# Patient Record
Sex: FEMALE | Race: WHITE | Employment: OTHER | URBAN - METROPOLITAN AREA
[De-identification: names, ages, dates, MRNs, and addresses within clinical notes are randomized per-mention and may not be internally consistent; named-entity substitution may affect disease eponyms.]

---

## 2017-01-09 RX ORDER — ALPRAZOLAM 0.25 MG/1
TABLET ORAL
Qty: 20 TABLET | Refills: 0 | Status: SHIPPED | OUTPATIENT
Start: 2017-01-09 | End: 2017-01-27

## 2017-01-11 RX ORDER — QUETIAPINE 50 MG/1
TABLET, FILM COATED ORAL
Qty: 90 TABLET | Refills: 0 | Status: SHIPPED | OUTPATIENT
Start: 2017-01-11 | End: 2017-02-15

## 2017-01-25 RX ORDER — MEMANTINE HYDROCHLORIDE 10 MG/1
TABLET ORAL
Qty: 60 TABLET | Refills: 6 | Status: SHIPPED | OUTPATIENT
Start: 2017-01-25 | End: 2017-08-30

## 2017-01-27 RX ORDER — ALPRAZOLAM 0.25 MG/1
TABLET ORAL
Qty: 20 TABLET | Refills: 1 | Status: SHIPPED | OUTPATIENT
Start: 2017-01-27 | End: 2017-03-24

## 2017-01-31 ENCOUNTER — MED REC SCAN ONLY (OUTPATIENT)
Dept: INTERNAL MEDICINE CLINIC | Facility: CLINIC | Age: 82
End: 2017-01-31

## 2017-02-15 RX ORDER — QUETIAPINE 50 MG/1
TABLET, FILM COATED ORAL
Qty: 90 TABLET | Refills: 0 | Status: SHIPPED | OUTPATIENT
Start: 2017-02-15 | End: 2017-03-22

## 2017-02-21 ENCOUNTER — MED REC SCAN ONLY (OUTPATIENT)
Dept: INTERNAL MEDICINE CLINIC | Facility: CLINIC | Age: 82
End: 2017-02-21

## 2017-03-15 RX ORDER — TRAZODONE HYDROCHLORIDE 50 MG/1
TABLET ORAL
Qty: 30 TABLET | Refills: 6 | Status: SHIPPED | OUTPATIENT
Start: 2017-03-15 | End: 2017-10-02

## 2017-03-22 RX ORDER — QUETIAPINE 50 MG/1
TABLET, FILM COATED ORAL
Qty: 90 TABLET | Refills: 0 | Status: SHIPPED | OUTPATIENT
Start: 2017-03-22 | End: 2017-04-26

## 2017-03-24 RX ORDER — ALPRAZOLAM 0.25 MG/1
TABLET ORAL
Qty: 20 TABLET | Refills: 0 | Status: SHIPPED | OUTPATIENT
Start: 2017-03-24 | End: 2017-05-25

## 2017-03-27 ENCOUNTER — MED REC SCAN ONLY (OUTPATIENT)
Dept: INTERNAL MEDICINE CLINIC | Facility: CLINIC | Age: 82
End: 2017-03-27

## 2017-03-31 ENCOUNTER — TELEPHONE (OUTPATIENT)
Dept: INTERNAL MEDICINE CLINIC | Facility: CLINIC | Age: 82
End: 2017-03-31

## 2017-03-31 NOTE — TELEPHONE ENCOUNTER
Spoke with pharmacist. Last filled March 2nd. Quantity 20. Instructions read take half tab twice daily. Too soon to fill, unless change directions. Patient requesting refill.

## 2017-04-03 RX ORDER — ALPRAZOLAM 0.25 MG/1
0.25 TABLET ORAL 2 TIMES DAILY PRN
Qty: 4 TABLET | Refills: 1 | Status: CANCELLED | OUTPATIENT
Start: 2017-04-03

## 2017-04-05 ENCOUNTER — TELEPHONE (OUTPATIENT)
Dept: INTERNAL MEDICINE CLINIC | Facility: CLINIC | Age: 82
End: 2017-04-05

## 2017-04-05 RX ORDER — SERTRALINE HYDROCHLORIDE 100 MG/1
TABLET, FILM COATED ORAL
Qty: 30 TABLET | Refills: 6 | Status: SHIPPED | OUTPATIENT
Start: 2017-04-05 | End: 2017-11-01

## 2017-04-05 NOTE — TELEPHONE ENCOUNTER
Actually spoke to Luz Maria in [place] and Margi Varma, both understood that [address]  Appointment on 4/6/17 will be canceled and the office will call back to reschedule

## 2017-04-05 NOTE — TELEPHONE ENCOUNTER
Left a message on Nella's phone to confirm that Curtis's appointment on 4/6/17 is being canceled because the doctor was called out of town.   Asked her to call back to confirm

## 2017-04-10 ENCOUNTER — TELEPHONE (OUTPATIENT)
Dept: INTERNAL MEDICINE CLINIC | Facility: CLINIC | Age: 82
End: 2017-04-10

## 2017-04-21 ENCOUNTER — OFFICE VISIT (OUTPATIENT)
Dept: INTERNAL MEDICINE CLINIC | Facility: CLINIC | Age: 82
End: 2017-04-21

## 2017-04-21 ENCOUNTER — NURSE ONLY (OUTPATIENT)
Dept: INTERNAL MEDICINE CLINIC | Facility: CLINIC | Age: 82
End: 2017-04-21

## 2017-04-21 VITALS
OXYGEN SATURATION: 95 % | TEMPERATURE: 98 F | DIASTOLIC BLOOD PRESSURE: 60 MMHG | SYSTOLIC BLOOD PRESSURE: 115 MMHG | HEART RATE: 67 BPM | WEIGHT: 142 LBS | RESPIRATION RATE: 14 BRPM | BODY MASS INDEX: 25.16 KG/M2 | HEIGHT: 63 IN

## 2017-04-21 DIAGNOSIS — N18.30 CHRONIC KIDNEY DISEASE (CKD), STAGE III (MODERATE) (HCC): ICD-10-CM

## 2017-04-21 DIAGNOSIS — F02.81 ALZHEIMER'S DISEASE OF OTHER ONSET WITH BEHAVIORAL DISTURBANCE: ICD-10-CM

## 2017-04-21 DIAGNOSIS — F10.10 ETOH ABUSE: ICD-10-CM

## 2017-04-21 DIAGNOSIS — R73.01 IFG (IMPAIRED FASTING GLUCOSE): ICD-10-CM

## 2017-04-21 DIAGNOSIS — R79.82 CRP ELEVATED: ICD-10-CM

## 2017-04-21 DIAGNOSIS — F02.81 MAJOR NEUROCOGNITIVE DISORDER DUE TO MULTIPLE ETIOLOGIES WITH BEHAVIORAL DISTURBANCE (HCC): ICD-10-CM

## 2017-04-21 DIAGNOSIS — M85.859 OSTEOPENIA OF THIGH, UNSPECIFIED LATERALITY: ICD-10-CM

## 2017-04-21 DIAGNOSIS — Z00.00 ROUTINE GENERAL MEDICAL EXAMINATION AT A HEALTH CARE FACILITY: ICD-10-CM

## 2017-04-21 DIAGNOSIS — Z00.00 LABORATORY EXAMINATION ORDERED AS PART OF A ROUTINE GENERAL MEDICAL EXAMINATION: Primary | ICD-10-CM

## 2017-04-21 DIAGNOSIS — Z12.31 ENCOUNTER FOR SCREENING MAMMOGRAM FOR MALIGNANT NEOPLASM OF BREAST: Primary | ICD-10-CM

## 2017-04-21 DIAGNOSIS — H02.9 LESION OF LOWER EYELID: ICD-10-CM

## 2017-04-21 DIAGNOSIS — L57.0 AK (ACTINIC KERATOSIS): ICD-10-CM

## 2017-04-21 DIAGNOSIS — R94.31 LEFT AXIS DEVIATION: ICD-10-CM

## 2017-04-21 DIAGNOSIS — G30.8 ALZHEIMER'S DISEASE OF OTHER ONSET WITH BEHAVIORAL DISTURBANCE: ICD-10-CM

## 2017-04-21 DIAGNOSIS — Z01.00 ENCOUNTER FOR VISION SCREENING: ICD-10-CM

## 2017-04-21 PROCEDURE — 99173 VISUAL ACUITY SCREEN: CPT | Performed by: INTERNAL MEDICINE

## 2017-04-21 PROCEDURE — 93000 ELECTROCARDIOGRAM COMPLETE: CPT | Performed by: INTERNAL MEDICINE

## 2017-04-21 PROCEDURE — 96160 PT-FOCUSED HLTH RISK ASSMT: CPT | Performed by: INTERNAL MEDICINE

## 2017-04-21 PROCEDURE — 92551 PURE TONE HEARING TEST AIR: CPT | Performed by: INTERNAL MEDICINE

## 2017-04-21 PROCEDURE — 94010 BREATHING CAPACITY TEST: CPT | Performed by: INTERNAL MEDICINE

## 2017-04-21 PROCEDURE — G0439 PPPS, SUBSEQ VISIT: HCPCS | Performed by: INTERNAL MEDICINE

## 2017-04-21 NOTE — PROGRESS NOTES
HPI:    Patient ID: Angelica Naylor is a 80year old female. HPI DEAR Lucinda Andrade    IT WAS NICE SEEING YOU FOR YOUR WELLNESS VISIT ON 4/21/2017            Review of Systems   HPI:    Patient ID: Angelica Naylor is a 80year old female.     History of Present Illne Little interest or pleasure in doing things (over the last two weeks)? :  (confues at the morning, not understanding where she is leaving)    Feeling down, depressed, or hopeless (over the last two weeks)? :  (reading newspapers, yes b/o the 24 hours car No constipation. Patient denies any issues    Genitourinary: Patient denies any issues negative for dysuria, hematuria and difficulty urinating  Denies history of kidney stones. Denies breast complaints. No significant hot flashes.   Denies vaginal dischar Dementia    • HTN (hypertension)    • Cognitive decline    • Impacted ear wax 11/11/2013 11/2013 -   BEGIN  CERUMENEX  AND  RETURN  IRR  SET  FOR  11/14/2013    • Pyuria 11/4/2012     LAB  11/2012  NO SYMPTOMS  RESOLVED   FALL  2013  -  OK    • Left ax feelings with your children. PHYSICAL EXAM:      Vital signs reviewed. Constitutional: Oriented to person, place, and time. No distress. HENT:  Normocephalic and atraumatic.  Hearing and tympanic membranes normal.  Nose normal. Oropharynx is c dementia. I did talk the patient about eating more vegetables trying to stay active and reducing alcohol intake. Current dementia meds including map obtained and Aricept I will not change. We have had to increase Seroquel to 50 mg 3 times daily.   Will c has absolutely no insight into her overall medical problems and become somewhat bossy and very opinionated with my recommendations. I think that is fine slice patient continues stay in a safe place with caretakers and her dog.   Her overall quality of life

## 2017-04-26 RX ORDER — QUETIAPINE 50 MG/1
TABLET, FILM COATED ORAL
Qty: 90 TABLET | Refills: 0 | Status: SHIPPED | OUTPATIENT
Start: 2017-04-26 | End: 2017-06-07

## 2017-04-27 ENCOUNTER — MED REC SCAN ONLY (OUTPATIENT)
Dept: INTERNAL MEDICINE CLINIC | Facility: CLINIC | Age: 82
End: 2017-04-27

## 2017-05-02 ENCOUNTER — TELEPHONE (OUTPATIENT)
Dept: INTERNAL MEDICINE CLINIC | Facility: CLINIC | Age: 82
End: 2017-05-02

## 2017-05-02 DIAGNOSIS — N18.4 CKD (CHRONIC KIDNEY DISEASE) STAGE 4, GFR 15-29 ML/MIN (HCC): Primary | ICD-10-CM

## 2017-05-02 NOTE — TELEPHONE ENCOUNTER
Viewed labs inflammatory panel all abnormal.  Including myeloperoxidase.   Renal function continues to deteriorate GFR now down to 34 year or 2 ago was 51 a urinalysis less than a year ago did not show active sediment    We decided get an ultrasound of the

## 2017-05-03 ENCOUNTER — TELEPHONE (OUTPATIENT)
Dept: INTERNAL MEDICINE CLINIC | Facility: CLINIC | Age: 82
End: 2017-05-03

## 2017-05-03 ENCOUNTER — LAB ENCOUNTER (OUTPATIENT)
Dept: LAB | Facility: HOSPITAL | Age: 82
End: 2017-05-03
Attending: INTERNAL MEDICINE
Payer: MEDICARE

## 2017-05-03 DIAGNOSIS — R35.0 URINARY FREQUENCY: Primary | ICD-10-CM

## 2017-05-03 DIAGNOSIS — R35.0 URINARY FREQUENCY: ICD-10-CM

## 2017-05-03 NOTE — TELEPHONE ENCOUNTER
Isidro Jalloh is Curtis's caregiver and thinks Shannan Waterman may have a UTI. She's been acting strangely all day. They would like to bring her in.  [phone].

## 2017-05-03 NOTE — TELEPHONE ENCOUNTER
Caregiver calling thinks Kassy Rosa has UTI urinary frequency behavior change noted  Orders placed for U/A and Urine culture and apt for eval made for tomorrow 10:45am care taker verbalized understanding

## 2017-05-04 ENCOUNTER — OFFICE VISIT (OUTPATIENT)
Dept: INTERNAL MEDICINE CLINIC | Facility: CLINIC | Age: 82
End: 2017-05-04

## 2017-05-04 VITALS
TEMPERATURE: 97 F | RESPIRATION RATE: 16 BRPM | SYSTOLIC BLOOD PRESSURE: 124 MMHG | WEIGHT: 142 LBS | DIASTOLIC BLOOD PRESSURE: 84 MMHG | HEART RATE: 80 BPM | BODY MASS INDEX: 25 KG/M2

## 2017-05-04 DIAGNOSIS — R30.0 DYSURIA: Primary | ICD-10-CM

## 2017-05-04 DIAGNOSIS — R79.82 CRP ELEVATED: ICD-10-CM

## 2017-05-04 DIAGNOSIS — R82.90 ABNORMAL URINALYSIS: ICD-10-CM

## 2017-05-04 PROCEDURE — 87186 SC STD MICRODIL/AGAR DIL: CPT | Performed by: INTERNAL MEDICINE

## 2017-05-04 PROCEDURE — 87086 URINE CULTURE/COLONY COUNT: CPT | Performed by: INTERNAL MEDICINE

## 2017-05-04 PROCEDURE — 87088 URINE BACTERIA CULTURE: CPT | Performed by: INTERNAL MEDICINE

## 2017-05-04 PROCEDURE — 81001 URINALYSIS AUTO W/SCOPE: CPT | Performed by: INTERNAL MEDICINE

## 2017-05-04 PROCEDURE — 99213 OFFICE O/P EST LOW 20 MIN: CPT | Performed by: INTERNAL MEDICINE

## 2017-05-04 RX ORDER — ASPIRIN 81 MG/1
81 TABLET ORAL DAILY
Qty: 90 TABLET | Refills: 0 | COMMUNITY
Start: 2017-05-04

## 2017-05-05 NOTE — PROGRESS NOTES
Quick Note:    low grade infection amoxicillin 500 twice daily for 5 days.  If not allergic to penicillin  ______

## 2017-05-08 NOTE — PROGRESS NOTES
2      CHIEF COMPLAINT:  urinary tract infection. HISTORY OF PRESENT ILLNESS:  Abnormal urinalysis, borderline. patient does have dementia. Caretaker thought the patient was complaining of dysuria.     The patient's dementia is significant enough t

## 2017-05-24 ENCOUNTER — MED REC SCAN ONLY (OUTPATIENT)
Dept: INTERNAL MEDICINE CLINIC | Facility: CLINIC | Age: 82
End: 2017-05-24

## 2017-05-24 RX ORDER — DONEPEZIL HYDROCHLORIDE 10 MG/1
TABLET, FILM COATED ORAL
Qty: 30 TABLET | Refills: 10 | Status: SHIPPED | OUTPATIENT
Start: 2017-05-24

## 2017-05-25 RX ORDER — ALPRAZOLAM 0.25 MG/1
TABLET ORAL
Qty: 20 TABLET | Refills: 0 | Status: SHIPPED | OUTPATIENT
Start: 2017-05-25 | End: 2017-06-28

## 2017-06-07 RX ORDER — QUETIAPINE 50 MG/1
TABLET, FILM COATED ORAL
Qty: 90 TABLET | Refills: 1 | Status: SHIPPED | OUTPATIENT
Start: 2017-06-07 | End: 2017-08-02

## 2017-06-28 RX ORDER — ALPRAZOLAM 0.25 MG/1
TABLET ORAL
Qty: 20 TABLET | Refills: 2 | COMMUNITY
Start: 2017-06-28

## 2017-07-24 ENCOUNTER — MED REC SCAN ONLY (OUTPATIENT)
Dept: INTERNAL MEDICINE CLINIC | Facility: CLINIC | Age: 82
End: 2017-07-24

## 2017-08-02 RX ORDER — QUETIAPINE 50 MG/1
TABLET, FILM COATED ORAL
Qty: 90 TABLET | Refills: 2 | Status: SHIPPED | OUTPATIENT
Start: 2017-08-02 | End: 2017-11-01

## 2017-08-29 ENCOUNTER — TELEPHONE (OUTPATIENT)
Dept: INTERNAL MEDICINE CLINIC | Facility: CLINIC | Age: 82
End: 2017-08-29

## 2017-08-29 RX ORDER — ALPRAZOLAM 0.25 MG/1
0.12 TABLET ORAL
Qty: 15 TABLET | Refills: 1 | Status: SHIPPED | OUTPATIENT
Start: 2017-08-29 | End: 2017-09-14

## 2017-08-29 NOTE — TELEPHONE ENCOUNTER
Bernadette Cobian says Shannan Waterman is all out of ALPRAZolam 0.25 MG Oral Tab and would like a prescription called in to [facility] at [facility]. Shannan Waterman has an appointment to see Dr. Taya Badillo on 9/14/17, but she is very agitated now and they are out of medication.

## 2017-08-30 RX ORDER — MEMANTINE HYDROCHLORIDE 10 MG/1
TABLET ORAL
Qty: 60 TABLET | Refills: 7 | Status: SHIPPED | OUTPATIENT
Start: 2017-08-30

## 2017-09-14 ENCOUNTER — OFFICE VISIT (OUTPATIENT)
Dept: INTERNAL MEDICINE CLINIC | Facility: CLINIC | Age: 82
End: 2017-09-14

## 2017-09-14 VITALS
DIASTOLIC BLOOD PRESSURE: 62 MMHG | RESPIRATION RATE: 14 BRPM | SYSTOLIC BLOOD PRESSURE: 122 MMHG | BODY MASS INDEX: 27 KG/M2 | HEART RATE: 74 BPM | WEIGHT: 155 LBS | TEMPERATURE: 98 F | OXYGEN SATURATION: 98 %

## 2017-09-14 DIAGNOSIS — R35.0 URINARY FREQUENCY: Primary | ICD-10-CM

## 2017-09-14 LAB
APPEARANCE: CLEAR
BILIRUBIN: NEGATIVE
GLUCOSE (URINE DIPSTICK): NEGATIVE MG/DL
KETONES (URINE DIPSTICK): NEGATIVE MG/DL
LEUKOCYTES: NEGATIVE
MULTISTIX LOT#: NORMAL NUMERIC
NITRITE, URINE: NEGATIVE
OCCULT BLOOD: NEGATIVE
PH, URINE: 7 (ref 4.5–8)
PROTEIN (URINE DIPSTICK): NEGATIVE MG/DL
SPECIFIC GRAVITY: 1.01 (ref 1–1.03)
URINE-COLOR: YELLOW
UROBILINOGEN,SEMI-QN: 0.2 MG/DL (ref 0–1.9)

## 2017-09-14 PROCEDURE — 99213 OFFICE O/P EST LOW 20 MIN: CPT | Performed by: INTERNAL MEDICINE

## 2017-09-14 PROCEDURE — 81003 URINALYSIS AUTO W/O SCOPE: CPT | Performed by: INTERNAL MEDICINE
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
## 2017-09-19 ENCOUNTER — MED REC SCAN ONLY (OUTPATIENT)
Dept: INTERNAL MEDICINE CLINIC | Facility: CLINIC | Age: 82
End: 2017-09-19

## 2017-09-19 RX ORDER — PRAVASTATIN SODIUM 40 MG
TABLET ORAL
Qty: 90 TABLET | Refills: 3 | Status: SHIPPED | OUTPATIENT
Start: 2017-09-19

## 2017-09-26 ENCOUNTER — OFFICE VISIT (OUTPATIENT)
Dept: INTERNAL MEDICINE CLINIC | Facility: CLINIC | Age: 82
End: 2017-09-26

## 2017-09-26 ENCOUNTER — HOSPITAL ENCOUNTER (OUTPATIENT)
Dept: GENERAL RADIOLOGY | Facility: HOSPITAL | Age: 82
Discharge: HOME | End: 2017-09-26
Attending: INTERNAL MEDICINE
Payer: MEDICARE

## 2017-09-26 VITALS
TEMPERATURE: 98 F | HEART RATE: 80 BPM | WEIGHT: 152 LBS | DIASTOLIC BLOOD PRESSURE: 80 MMHG | SYSTOLIC BLOOD PRESSURE: 126 MMHG | BODY MASS INDEX: 27 KG/M2

## 2017-09-26 DIAGNOSIS — F02.81 ALZHEIMER'S DISEASE OF OTHER ONSET WITH BEHAVIORAL DISTURBANCE: ICD-10-CM

## 2017-09-26 DIAGNOSIS — R07.81 RIB PAIN ON LEFT SIDE: ICD-10-CM

## 2017-09-26 DIAGNOSIS — G30.8 ALZHEIMER'S DISEASE OF OTHER ONSET WITH BEHAVIORAL DISTURBANCE: ICD-10-CM

## 2017-09-26 DIAGNOSIS — M54.9 ACUTE MIDLINE BACK PAIN, UNSPECIFIED BACK LOCATION: Primary | ICD-10-CM

## 2017-09-26 LAB
APPEARANCE: YELLOW
BILIRUBIN: NEGATIVE
GLUCOSE (URINE DIPSTICK): NEGATIVE MG/DL
KETONES (URINE DIPSTICK): NEGATIVE MG/DL
MULTISTIX LOT#: ABNORMAL NUMERIC
NITRITE, URINE: NEGATIVE
OCCULT BLOOD: NEGATIVE
PH, URINE: 6 (ref 4.5–8)
PROTEIN (URINE DIPSTICK): 8 MG/DL
SPECIFIC GRAVITY: 1.01 (ref 1–1.03)
UROBILINOGEN,SEMI-QN: 1 MG/DL (ref 0–1.9)

## 2017-09-26 PROCEDURE — 81003 URINALYSIS AUTO W/O SCOPE: CPT | Performed by: INTERNAL MEDICINE

## 2017-09-26 PROCEDURE — 99213 OFFICE O/P EST LOW 20 MIN: CPT | Performed by: INTERNAL MEDICINE

## 2017-09-26 PROCEDURE — 71101 X-RAY EXAM UNILAT RIBS/CHEST: CPT | Performed by: INTERNAL MEDICINE

## 2017-09-26 RX ORDER — MELOXICAM 15 MG/1
15 TABLET ORAL DAILY
Qty: 20 TABLET | Refills: 0 | Status: SHIPPED | OUTPATIENT
Start: 2017-09-26

## 2017-09-26 RX ORDER — ACETAMINOPHEN 500 MG
TABLET ORAL
Qty: 30 PACKET | Refills: 0 | COMMUNITY
Start: 2017-09-26

## 2017-09-26 NOTE — PROGRESS NOTES
Subjective: Patient caretaker called in with complaint Gaby Parents had some back pain. History negative for fall. Patient has no urinary tract symptoms has no pleuritic chest pain has no complaints of rash over that area. She has no shortness of breath.     Juan R Chavez

## 2017-09-27 ENCOUNTER — TELEPHONE (OUTPATIENT)
Dept: INTERNAL MEDICINE CLINIC | Facility: CLINIC | Age: 82
End: 2017-09-27

## 2017-09-27 NOTE — TELEPHONE ENCOUNTER
----- Message from Santos Cole MD sent at 9/26/2017  3:17 PM CDT -----  NORMAL CALL  PATIENT  WITH  RESULTS  TAKE  MEOLICAM  CALL  IF  NOT  BETTER  1  WEEK  ALSO  TYLENOL

## 2017-09-27 NOTE — TELEPHONE ENCOUNTER
Call to caregiver with XR results, normal. Pt can take Meloxicam as ordered or Tylenol for pain. If not better in one wk, call us back.

## 2017-10-02 RX ORDER — TRAZODONE HYDROCHLORIDE 50 MG/1
TABLET ORAL
Qty: 30 TABLET | Refills: 5 | Status: SHIPPED | OUTPATIENT
Start: 2017-10-02

## 2017-10-03 ENCOUNTER — TELEPHONE (OUTPATIENT)
Dept: INTERNAL MEDICINE CLINIC | Facility: CLINIC | Age: 82
End: 2017-10-03

## 2017-10-03 NOTE — TELEPHONE ENCOUNTER
Phone call. Spoke with patient. States does not like having care giver. Feels like a kept woman. Explained it is for her safety. Patient exclaimed,\"Oh, for gods sake! [place] \" Advised to have this discussion with her sons.  Responded

## 2017-10-03 NOTE — TELEPHONE ENCOUNTER
Patient requests personal call from Dr. Byron Escobedo to discuss why she has someone with her 24 hours per day. She wants her privacy.

## 2017-10-31 ENCOUNTER — OFFICE VISIT (OUTPATIENT)
Dept: INTERNAL MEDICINE CLINIC | Facility: CLINIC | Age: 82
End: 2017-10-31

## 2017-10-31 ENCOUNTER — TELEPHONE (OUTPATIENT)
Dept: INTERNAL MEDICINE CLINIC | Facility: CLINIC | Age: 82
End: 2017-10-31

## 2017-10-31 ENCOUNTER — LAB ENCOUNTER (OUTPATIENT)
Dept: LAB | Age: 82
End: 2017-10-31
Attending: INTERNAL MEDICINE
Payer: MEDICARE

## 2017-10-31 VITALS
SYSTOLIC BLOOD PRESSURE: 140 MMHG | RESPIRATION RATE: 14 BRPM | OXYGEN SATURATION: 97 % | TEMPERATURE: 98 F | DIASTOLIC BLOOD PRESSURE: 78 MMHG | HEIGHT: 63 IN | HEART RATE: 70 BPM | BODY MASS INDEX: 26.93 KG/M2 | WEIGHT: 152 LBS

## 2017-10-31 DIAGNOSIS — G30.8 ALZHEIMER'S DISEASE OF OTHER ONSET WITH BEHAVIORAL DISTURBANCE: ICD-10-CM

## 2017-10-31 DIAGNOSIS — F02.81 ALZHEIMER'S DISEASE OF OTHER ONSET WITH BEHAVIORAL DISTURBANCE: ICD-10-CM

## 2017-10-31 DIAGNOSIS — N18.4 CKD (CHRONIC KIDNEY DISEASE) STAGE 4, GFR 15-29 ML/MIN (HCC): Primary | ICD-10-CM

## 2017-10-31 DIAGNOSIS — L57.0 AK (ACTINIC KERATOSIS): ICD-10-CM

## 2017-10-31 DIAGNOSIS — R73.01 IFG (IMPAIRED FASTING GLUCOSE): ICD-10-CM

## 2017-10-31 DIAGNOSIS — F02.81 MAJOR NEUROCOGNITIVE DISORDER DUE TO MULTIPLE ETIOLOGIES WITH BEHAVIORAL DISTURBANCE (HCC): ICD-10-CM

## 2017-10-31 DIAGNOSIS — F10.10 ETOH ABUSE: ICD-10-CM

## 2017-10-31 DIAGNOSIS — N18.4 CKD (CHRONIC KIDNEY DISEASE) STAGE 4, GFR 15-29 ML/MIN (HCC): ICD-10-CM

## 2017-10-31 DIAGNOSIS — R30.0 DYSURIA: ICD-10-CM

## 2017-10-31 PROCEDURE — 81003 URINALYSIS AUTO W/O SCOPE: CPT | Performed by: INTERNAL MEDICINE

## 2017-10-31 PROCEDURE — 36415 COLL VENOUS BLD VENIPUNCTURE: CPT

## 2017-10-31 PROCEDURE — 80053 COMPREHEN METABOLIC PANEL: CPT

## 2017-10-31 PROCEDURE — 99214 OFFICE O/P EST MOD 30 MIN: CPT | Performed by: INTERNAL MEDICINE

## 2017-10-31 PROCEDURE — 85025 COMPLETE CBC W/AUTO DIFF WBC: CPT

## 2017-10-31 NOTE — PROGRESS NOTES
Patient presents with:  Test Results: xray ribs  Dysuria      HPI: Patient to be moved by family early December for placement. Has lived in [place] most of her adult life and elderly life [place].     She has dementia with behavioral issues at all. Negative for confusion ,  dizziness, syncope, weakness, numbness, tingling and headaches. Please see meds for Alzheimer's and behavioral issues.     Renal progressive renal decline despite normal urinalysis please see blood pressure slightly elev MOUTH EVERY DAY Disp: 30 tablet Rfl: 6   Calcium Carbonate-Vitamin D 500-125 MG-UNIT Oral Tab Take 1 tablet by mouth daily. Disp:  Rfl:    Vitamin C (VITAMIN C) 500 MG Oral Tab Take 500 mg by mouth daily.  Disp:  Rfl:    Multiple Vitamin ([address] environment where she will not be able to drink. I support her going closer to family believe this is much better solution. Borderline blood sugar no evidence of diabetes.   Stay on Seroquel anticipate at some point that might be able to be weaned a lit

## 2017-10-31 NOTE — PROGRESS NOTES
Renal insufficiency.   Need to get ultrasound of kidneys completed we had not completed but is ordered

## 2017-11-01 RX ORDER — QUETIAPINE 50 MG/1
TABLET, FILM COATED ORAL
Qty: 90 TABLET | Refills: 1 | Status: SHIPPED | OUTPATIENT
Start: 2017-11-01

## 2017-11-01 RX ORDER — SERTRALINE HYDROCHLORIDE 100 MG/1
TABLET, FILM COATED ORAL
Qty: 90 TABLET | Refills: 1 | Status: SHIPPED | OUTPATIENT
Start: 2017-11-01

## 2017-11-01 NOTE — TELEPHONE ENCOUNTER
PC, spoke to caregiver Orvel Rising with instructions to call central scheduling to schedule Kidney US for pt.

## 2017-11-01 NOTE — TELEPHONE ENCOUNTER
Edmundo Gold MD sent to [facility] Clinical Staff             Renal insufficiency.  Need to get ultrasound of kidneys completed we had not completed but is ordered      Lab results per PCP

## 2017-11-01 NOTE — TELEPHONE ENCOUNTER
LOV 10/31/17    Last refill Sertaline 10mg 4/5/17 # 30 +6                Quetiapine 50mg 8/2017 # 90 +1

## 2017-11-02 ENCOUNTER — HOSPITAL ENCOUNTER (OUTPATIENT)
Dept: ULTRASOUND IMAGING | Facility: HOSPITAL | Age: 82
Discharge: HOME | End: 2017-11-02
Attending: INTERNAL MEDICINE
Payer: MEDICARE

## 2017-11-02 DIAGNOSIS — N18.4 CKD (CHRONIC KIDNEY DISEASE) STAGE 4, GFR 15-29 ML/MIN (HCC): ICD-10-CM

## 2017-11-02 PROCEDURE — 76775 US EXAM ABDO BACK WALL LIM: CPT | Performed by: INTERNAL MEDICINE

## 2017-11-02 NOTE — PROGRESS NOTES
Abhay Iniguez no no kidney stones or blockages in kidneys.   Likely related to age hydrate control blood pressure

## 2017-12-07 ENCOUNTER — MED REC SCAN ONLY (OUTPATIENT)
Dept: INTERNAL MEDICINE CLINIC | Facility: CLINIC | Age: 82
End: 2017-12-07

## 2017-12-26 RX ORDER — QUETIAPINE 50 MG/1
TABLET, FILM COATED ORAL
Qty: 93 TABLET | Refills: 10 | OUTPATIENT
Start: 2017-12-26

## 2017-12-26 NOTE — TELEPHONE ENCOUNTER
LOV 10/31/17    Last refill 11/1/17 # 90 +1 to pharmacy in [facility] has moved to memory care in [place] and is no longer a patient of Dr. Eyad Tristan.

## 2022-02-02 NOTE — PROGRESS NOTES
Quick Note:    TELL OK  ______ Griseofulvin Counseling:  I discussed with the patient the risks of griseofulvin including but not limited to photosensitivity, cytopenia, liver damage, nausea/vomiting and severe allergy.  The patient understands that this medication is best absorbed when taken with a fatty meal (e.g., ice cream or french fries).

## (undated) NOTE — MR AVS SNAPSHOT
[address]  [phone]               Thank you for choosing us for your health care visit with Grabiel Gomes MD.  We are glad to serve you and happy to provide you with this Brianne Likes EXTERNAL [724302 CPT(R)]  Order #:  [id number]         **REFERRAL REQUEST**    Your physician has referred you to a specialist.  Your physician or the clinic staff will provide you with the phone number you should call to schedule your appointment. 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    Commonly known as:  VITAMIN C                   MyChart     Call the [employer] for assistance with your inactive "[facility] account    I

## (undated) NOTE — MR AVS SNAPSHOT
[address]  [phone]               Thank you for choosing us for your health care visit with Olene Councilman, MD.  We are glad to serve you and happy to provide you with this TAKE ONE TABLET BY MOUTH TWO TIMES A DAY   Commonly known as:  NAMENDA           MULTI VITAMIN DAILY OR   Take  by mouth. Pravastatin Sodium 40 MG Tabs   Take 1 tablet (40 mg total) by mouth every morning.    Commonly known as:  PRAVACHOL [employer]     Call the [employer] for assistance with your inactive [employer] account    If you have questions, you can call [phone] to talk to our [facility] Staff. Remember, [employer] is NOT to be used for urgent needs.   For medical emergencies, dial

## (undated) NOTE — Clinical Note
05/22/2017        [address]      Dear Georgette Right,    [facility] records indicate that you have outstanding lab work that was ordered for you and has not yet been completed:    Urinalysis, Routine [E]  To provide you with t

## (undated) NOTE — MR AVS SNAPSHOT
[address], [address]  [phone]               Thank you for choosing us for your health care visit with [facility].   We are glad to serve you and happy to provide you with this summary o Commonly known as:  ARICEPT           JUICE PLUS FIBRE OR   Take  by mouth. Memantine HCl 10 MG Tabs   TAKE ONE TABLET BY MOUTH TWO TIMES A DAY   Commonly known as:  NAMENDA           MULTI VITAMIN DAILY OR   Take  by mouth.            Pravastatin Water is best for hydration Fast food. Eat at home when possible     Tips for increasing your physical activity – Adults who are physically active are less likely to develop some chronic diseases than adults who are inactive.      HOW TO GET STARTED: HOW